# Patient Record
(demographics unavailable — no encounter records)

---

## 2025-04-07 NOTE — HISTORY OF PRESENT ILLNESS
[FreeTextEntry1] : NATY PENNINGTON 26 YO, presents for Postpartum G 1 P 1001 - S/P C/S on 04/01/25 @ [facility].  For Category 2 tracing.  Breastfeeding Medication: PNV

## 2025-05-09 NOTE — HISTORY OF PRESENT ILLNESS
[FreeTextEntry1] : NATY PENNINGTON 26 YO, presents for Contraception. G 1 P 1001 - S/P C/S on 04/01/25 @ [facility]. For Category 2 tracing. Breastfeeding Medication: PNV. Contraception options informed.  From IUD to POP to Nexplanon to Condoms.  R/B/A informed, all questions answered.  Decided on POP.  No health issues, nonsmoker, NKDA

## 2025-06-26 NOTE — PLAN
[FreeTextEntry1] : 26yo here for annual gyn exam. C/p  scar pain/burning sensation, and vaginal itchiness. - discussed normal healing after C/S - UTD on pap smear - vaginovulvitis, possibly yeast infection - sent rx clotrimazole/BMZ for symptomatic relief, will f/u vaginitis swab and ucx - discussed contraception, given pt feels will not be able to take pill at same time every day, we discussed OCPs as well as other forms of contraception - will rx patches - discussed proper use and risks of VTE  Will call w results of swab, RTO in 3 months for contraception check.

## 2025-06-26 NOTE — PHYSICAL EXAM
[MA] : MA [Appropriately responsive] : appropriately responsive [Alert] : alert [No Acute Distress] : no acute distress [Soft] : soft [Non-tender] : non-tender [Non-distended] : non-distended [No HSM] : No HSM [No Lesions] : no lesions [No Mass] : no mass [Oriented x3] : oriented x3 [Examination Of The Breasts] : a normal appearance [No Masses] : no breast masses were palpable [Vulvitis] : vulvitis [Labia Majora] : normal [Labia Minora] : normal [Discharge] : a  ~M vaginal discharge was present [Moderate] : moderate [White] : white [Thick] : thick [No Bleeding] : There was no active vaginal bleeding [Normal] : normal [Uterine Adnexae] : non-palpable [FreeTextEntry2] : Christine [FreeTextEntry7] : Pfannenstiel incision c/d/i w some minimal scabbing; non erythematous, non tender, no exudates [Tenderness] : nontender [Enlarged ___ wks] : not enlarged [Mass ___ cm] : no uterine mass was palpated [FreeTextEntry6] : retroflexed - hx of CS

## 2025-06-26 NOTE — HISTORY OF PRESENT ILLNESS
[FreeTextEntry1] : CC: Annual LMP: 2024  Here c/o vaginal itchiness and  scar pain S/p CS 2 months ago. Breast feeding and bottle feeding Periods have not resumed yet. Not on contraception - was prescribed POPs but did not  prescription Not having sex yet - attempted once and it hurt too much  OBHx: CSx1 GynHx: menarche age 12, irregular periods [id number], sometimes for 6 months but became regular after OCPs pap 2024 denies hx of STIs sexually active w 1 male partner PMH: denies PSH: CSx1 FmHx: denies fmhx of cancers Social: [place] the [place] [employer] Allergies: NKDA Meds: none

## 2025-06-26 NOTE — HISTORY OF PRESENT ILLNESS
[FreeTextEntry1] : CC: Annual LMP: 2024  Here c/o vaginal itchiness and  scar pain S/p CS 2 months ago. Breast feeding and bottle feeding Periods have not resumed yet. Not on contraception - was prescribed POPs but did not  prescription Not having sex yet - attempted once and it hurt too much  OBHx: CSx1 GynHx: menarche age 12, irregular periods [id number], sometimes for 6 months but became regular after OCPs pap 2024 denies hx of STIs sexually active w 1 male partner PMH: denies PSH: CSx1 FmHx: denies fmhx of cancers Social: denies [place] [employer] exchange Allergies: NKDA Meds: none